# Patient Record
Sex: FEMALE | Race: WHITE | URBAN - METROPOLITAN AREA
[De-identification: names, ages, dates, MRNs, and addresses within clinical notes are randomized per-mention and may not be internally consistent; named-entity substitution may affect disease eponyms.]

---

## 2017-02-26 ENCOUNTER — OFFICE VISIT (OUTPATIENT)
Dept: FAMILY MEDICINE CLINIC | Facility: CLINIC | Age: 57
End: 2017-02-26

## 2017-02-26 VITALS
DIASTOLIC BLOOD PRESSURE: 78 MMHG | BODY MASS INDEX: 27 KG/M2 | SYSTOLIC BLOOD PRESSURE: 126 MMHG | TEMPERATURE: 98 F | OXYGEN SATURATION: 98 % | HEART RATE: 87 BPM | WEIGHT: 175 LBS | RESPIRATION RATE: 18 BRPM

## 2017-02-26 DIAGNOSIS — H65.01 RIGHT ACUTE SEROUS OTITIS MEDIA, RECURRENCE NOT SPECIFIED: ICD-10-CM

## 2017-02-26 DIAGNOSIS — J45.20 MILD INTERMITTENT ASTHMA WITHOUT COMPLICATION: ICD-10-CM

## 2017-02-26 DIAGNOSIS — J06.9 VIRAL URI WITH COUGH: Primary | ICD-10-CM

## 2017-02-26 PROCEDURE — 99213 OFFICE O/P EST LOW 20 MIN: CPT | Performed by: PHYSICIAN ASSISTANT

## 2017-02-26 RX ORDER — AMOXICILLIN 875 MG/1
875 TABLET, COATED ORAL 2 TIMES DAILY
Qty: 20 TABLET | Refills: 0 | Status: SHIPPED | OUTPATIENT
Start: 2017-02-26 | End: 2017-03-08

## 2017-02-26 NOTE — PATIENT INSTRUCTIONS
flovent 2 puffs inh twice a day   Ventolin every 4 hours as needed for wheezing  If ear pain worsens, then start amoxicillin to cover for fluid in the ears. Adult Self-Care for Colds  Colds are caused by viruses. They can’t be cured with antibiotics. · As your appetite returns, you can resume your normal diet.  Ask your doctor whether there are any foods you should avoid.     When to seek medical care  When you first notice symptoms, ask your health care provider if antiviral medications are appropriate __________________________     __________________________     __________________________     __________________________     __________________________ Long-term controllers:  __________________________  Name:  __________________________  Dose:  ___________ __________________________  Less than 50% of personal best · Use your quick-relief medicines  · Call your healthcare provider     Call 911 if:  · It is getting harder to breathe  · You can't walk or talk  · Your lips or fingers look barba or blue Donalrelyn Grapes

## 2017-02-26 NOTE — PROGRESS NOTES
CHIEF COMPLAINT:   Patient presents with:  URI      HPI:   Kemi Romero is a 49year old female who presents for upper respiratory symptoms for  5 days. Patient reports:runny nose, congestion, cough, mild wheezing/chest tightness, ear pressure.   Dang cetirizine (ZYRTEC ALLERGY) 10 MG Oral Tab Take 1 tablet by mouth daily.  Disp: 30 tablet Rfl: 0      Past Medical History   Diagnosis Date   • Asthma    • Hyperlipidemia    • Seasonal allergies    • Generalized anxiety disorder           Past Surgical Hist Right acute serous otitis media, recurrence not specified  Manju was seen today for uri.     Diagnoses and all orders for this visit:    Viral URI with cough    Mild intermittent asthma without complication    Right acute serous otitis media, recurrence not · Take acetaminophen or a nonsteroidal anti-inflammatory agent (NSAID), such as ibuprofen. Treat a troubled nose kindly  · Breathe steam or heated humidified air to open blocked nasal passages.  a hot shower or use a vaporizer.  Be careful not to g Date Last Reviewed: 6/19/2014  © 1447-5741 [address], [address] [place], [place] [address]. All rights reserved. This information is not intended as a substitute for professional medical care.  Always follow your healthcare professional You begin to have symptoms of a respiratory infection, if infections trigger your symptoms · Keep taking your long-term controller medicines  · Use your quick-relief medicine  · If you do not feel better within an hour after using your quick-relief medicin

## 2017-03-31 ENCOUNTER — TELEPHONE (OUTPATIENT)
Dept: FAMILY MEDICINE CLINIC | Facility: CLINIC | Age: 57
End: 2017-03-31

## 2017-07-12 ENCOUNTER — PATIENT OUTREACH (OUTPATIENT)
Dept: FAMILY MEDICINE CLINIC | Facility: CLINIC | Age: 57
End: 2017-07-12

## 2017-10-02 ENCOUNTER — OFFICE VISIT (OUTPATIENT)
Dept: FAMILY MEDICINE CLINIC | Facility: CLINIC | Age: 57
End: 2017-10-02

## 2017-10-02 VITALS
SYSTOLIC BLOOD PRESSURE: 112 MMHG | OXYGEN SATURATION: 99 % | DIASTOLIC BLOOD PRESSURE: 75 MMHG | TEMPERATURE: 99 F | HEART RATE: 76 BPM | RESPIRATION RATE: 18 BRPM

## 2017-10-02 DIAGNOSIS — E78.5 DYSLIPIDEMIA: ICD-10-CM

## 2017-10-02 DIAGNOSIS — IMO0001 MODERATE INTERMITTENT ASTHMA WITHOUT COMPLICATION: Primary | ICD-10-CM

## 2017-10-02 DIAGNOSIS — J30.2 CHRONIC SEASONAL ALLERGIC RHINITIS DUE TO OTHER ALLERGEN: ICD-10-CM

## 2017-10-02 DIAGNOSIS — F41.1 GENERALIZED ANXIETY DISORDER: ICD-10-CM

## 2017-10-02 PROCEDURE — 99214 OFFICE O/P EST MOD 30 MIN: CPT | Performed by: FAMILY MEDICINE

## 2017-10-02 RX ORDER — DESVENLAFAXINE 100 MG/1
100 TABLET, EXTENDED RELEASE ORAL DAILY
Qty: 30 TABLET | Refills: 12 | Status: SHIPPED | OUTPATIENT
Start: 2017-10-02 | End: 2018-10-11

## 2017-10-02 RX ORDER — ALBUTEROL SULFATE 90 UG/1
2 AEROSOL, METERED RESPIRATORY (INHALATION)
Qty: 18 G | Refills: 1 | Status: SHIPPED | OUTPATIENT
Start: 2017-10-02 | End: 2018-12-19

## 2017-10-02 NOTE — PATIENT INSTRUCTIONS
- Please change diet  start exercising. Start exercise to 3-5 times a week for 30-60 minutes which will improve youir cholesterol levels and protect your heart. - Foods high in omega-3's help good cholesterol and may lower bad cholesterol.  Eat salmon 2x ? Smoke from fireplaces  ? Vehicle exhaust  ? Smog or air pollution  ? Aerosol sprays  ? Strong odors, such as perfume, incense, or cooking odors  ? Household , such as ammonia or bleach  Allergens:  ? Cats  ? Dogs  ? Birds  ? Dust or dust mites  ? Allergic Rhinitis  Allergic rhinitis is an allergic reaction that affects the nose, and often the eyes. It’s often known as nasal allergies. Nasal allergies are often due to things in the environment that are breathed in.  Depending what you are sensitive t · Clean moldy areas with bleach and water. In general:  · Vacuum once or twice a week. If possible, use a vacuum with a high-efficiency particulate air (HEPA) filter. · Do not smoke. Avoid cigarette smoke.  Cigarette smoke is an irritant that can make sym

## 2017-10-02 NOTE — PROGRESS NOTES
CHIEF COMPLAINT: Patient presents with: Follow - Up: med refill    HPI:     Mj Lee is a 49year old female presents for recheck asthma and anxiety -needs med refills. Asthma-asthma since childhood. Triggers are URI and allergies.   Last as Paternal Grandmother    • Diabetes Father    • Prostate Cancer Father    • Cancer Paternal Grandfather      Lung   • Asthma Paternal Grandmother    • Cancer Maternal Grandfather      Lung   • Stroke Paternal Grandmother    • Asthma Son    • Allergies Son normocephaly, atraumatic. Sclera clear and non icteric bilaterally. Bilateral intact tympanic membranes without fluid and redness. Good cone of light bilaterally. Tonsils are clear no exudates bilaterally. Moist mucous membranes. Cobblestoning present.  Uvu AM 12/22/2011 100    • Bun/Creatinine Ratio 12/22/2011 16    • Sodium 12/22/2011 138    • Potassium 12/22/2011 4.8    • Chloride 12/22/2011 102    • Carbon Dioxide, Total 12/22/2011 23    • Calcium 12/22/2011 9.5    • Total Protein 12/22/2011 6.9    • Albu times daily. Rinse mouth after use  Dispense: 12 g; Refill: 12    3. Chronic seasonal allergic rhinitis due to other allergen  Continue otc zyrtec  Continue nasal steroid    4. Dyslipidemia  -Lipid panel is  Elevated.   - Please change diet  start exercisin Plan due on 10/31/2017  Asthma Control Test due on 10/31/2017      Patient/Caregiver Education: Patient/Caregiver Education: There are no barriers to learning. Medical education done. Outcome: Patient verbalizes understanding.  Patient is notified to call

## 2017-11-17 ENCOUNTER — PATIENT OUTREACH (OUTPATIENT)
Dept: FAMILY MEDICINE CLINIC | Facility: CLINIC | Age: 57
End: 2017-11-17

## 2017-11-17 NOTE — PROGRESS NOTES
Spoke to patient, patient is aware needs asthma visit and cpx visit. States she will call in December to schedule when she has her callander. No future appointments.

## 2018-02-19 ENCOUNTER — TELEPHONE (OUTPATIENT)
Dept: FAMILY MEDICINE CLINIC | Facility: CLINIC | Age: 58
End: 2018-02-19

## 2018-02-19 NOTE — TELEPHONE ENCOUNTER
Spoke to patient regarding symptoms. Patient believes she has stomach flu. Patient states she had 1 episode of vomiting followed by body aches and fatigue. Still feels tired and her appetite has not been strong. Patient began feeling sick on Friday.  Will m

## 2018-04-18 ENCOUNTER — TELEPHONE (OUTPATIENT)
Dept: FAMILY MEDICINE CLINIC | Facility: CLINIC | Age: 58
End: 2018-04-18

## 2018-05-11 NOTE — TELEPHONE ENCOUNTER
LMOM to return call to the office as this was my 3rd and final attempt to try to reach you. Provided pt office phone [phone] along with office hours given. Also, mailed patient a letter to contact office. Closing encounter.

## 2018-05-21 PROBLEM — M67.88 ACHILLES TENDINOSIS OF BOTH LOWER EXTREMITIES: Status: ACTIVE | Noted: 2018-05-21

## 2018-10-11 DIAGNOSIS — F41.1 GENERALIZED ANXIETY DISORDER: ICD-10-CM

## 2018-10-11 RX ORDER — DESVENLAFAXINE 100 MG/1
TABLET, EXTENDED RELEASE ORAL
Qty: 30 TABLET | Refills: 0 | Status: SHIPPED | OUTPATIENT
Start: 2018-10-11 | End: 2018-12-19

## 2018-10-11 NOTE — TELEPHONE ENCOUNTER
Please notify patient will not refill any medications until seen in office. Needs to come fasting is overdue for health maintenance, annual physical, mammogram and colon screening. Please asked patient if she has had any examinations elsewhere or seen an OB/GYN we need to call for records. Also needs annual psych visit for medication refill. May complete physical and med refill at same visit.   Thank you

## 2018-10-11 NOTE — TELEPHONE ENCOUNTER
PT requests a refill of desvenlafaxine, no protocol. Unable to approve. LOV 10/2/17  LF 10/2/17 #30 with 12 refills    No future appointments.

## 2018-10-20 DIAGNOSIS — IMO0001 MODERATE INTERMITTENT ASTHMA WITHOUT COMPLICATION: ICD-10-CM

## 2018-10-22 RX ORDER — DEXAMETHASONE 4 MG/1
TABLET ORAL
Qty: 12 G | Refills: 12 | OUTPATIENT
Start: 2018-10-22

## 2018-10-22 NOTE — TELEPHONE ENCOUNTER
Pt requesting refill of  Flovent protocol failed, unable to approve:     Last Time Medication was Filled:  10/2/17    Last Office Visit with PCP: 10/2/17    No future appointments.

## 2018-11-12 NOTE — TELEPHONE ENCOUNTER
LMOM to return call to the office as this is my 2nd attempt to try to reach you. Provided pt office phone [phone] along with office hours given. spouse

## 2018-11-16 DIAGNOSIS — F41.1 GENERALIZED ANXIETY DISORDER: ICD-10-CM

## 2018-11-16 RX ORDER — DESVENLAFAXINE 100 MG/1
TABLET, EXTENDED RELEASE ORAL
Qty: 30 TABLET | Refills: 0 | OUTPATIENT
Start: 2018-11-16

## 2018-11-16 NOTE — TELEPHONE ENCOUNTER
LOV 10/2/17    No refills without an appointment    Future Appointments   Date Time Provider Jeffrey Franco   11/28/2018  9:10 AM Skye Kaiser MD [facility] [address] [facility]

## 2018-11-19 DIAGNOSIS — F41.1 GENERALIZED ANXIETY DISORDER: ICD-10-CM

## 2018-11-19 RX ORDER — DESVENLAFAXINE 100 MG/1
TABLET, EXTENDED RELEASE ORAL
Qty: 30 TABLET | Refills: 0 | OUTPATIENT
Start: 2018-11-19

## 2018-11-19 NOTE — TELEPHONE ENCOUNTER
No refills without an appointment      Future Appointments   Date Time Provider Jeffrey Franco   11/28/2018  9:10 AM Fátima Kaiser MD [facility] [address] [facility]

## 2018-12-12 PROBLEM — M25.561 ACUTE PAIN OF RIGHT KNEE: Status: ACTIVE | Noted: 2018-12-12

## 2018-12-19 PROBLEM — E66.9 OBESITY (BMI 30.0-34.9): Status: ACTIVE | Noted: 2018-12-19

## 2018-12-27 PROCEDURE — 86803 HEPATITIS C AB TEST: CPT | Performed by: INTERNAL MEDICINE

## 2019-06-28 LAB — PATHOLOGIST NAME: NORMAL

## 2019-09-11 ENCOUNTER — HOSPITAL (OUTPATIENT)
Dept: OTHER | Age: 59
End: 2019-09-11
Attending: OBSTETRICS & GYNECOLOGY

## 2020-10-27 ENCOUNTER — OFFICE VISIT (OUTPATIENT)
Dept: DERMATOLOGY | Age: 60
End: 2020-10-27

## 2020-10-27 DIAGNOSIS — L72.3 SEBACEOUS CYST: ICD-10-CM

## 2020-10-27 DIAGNOSIS — D18.01 CHERRY ANGIOMA: Primary | ICD-10-CM

## 2020-10-27 DIAGNOSIS — L81.4 LENTIGO: ICD-10-CM

## 2020-10-27 PROCEDURE — 99202 OFFICE O/P NEW SF 15 MIN: CPT | Performed by: DERMATOLOGY

## 2020-10-27 PROCEDURE — 11301 SHAVE SKIN LESION 0.6-1.0 CM: CPT | Performed by: DERMATOLOGY

## 2020-10-27 RX ORDER — ALBUTEROL SULFATE 90 UG/1
2 AEROSOL, METERED RESPIRATORY (INHALATION)
COMMUNITY
Start: 2018-12-19

## 2020-10-27 RX ORDER — DEXAMETHASONE 4 MG/1
1 TABLET ORAL
COMMUNITY
Start: 2018-12-19

## 2020-10-27 RX ORDER — DESVENLAFAXINE 100 MG/1
TABLET, EXTENDED RELEASE ORAL EVERY MORNING
COMMUNITY
Start: 2020-10-08

## 2020-10-27 RX ORDER — GLUCOSAMINE HCL 500 MG
200 TABLET ORAL
COMMUNITY

## 2020-10-30 LAB — PATH REPORT PLASRBC-IMP: NORMAL

## 2021-04-20 ENCOUNTER — LAB REQUISITION (OUTPATIENT)
Dept: LAB | Age: 61
End: 2021-04-20

## 2021-04-20 DIAGNOSIS — Z12.4 ENCOUNTER FOR SCREENING FOR MALIGNANT NEOPLASM OF CERVIX: ICD-10-CM

## 2021-04-20 PROCEDURE — 87624 HPV HI-RISK TYP POOLED RSLT: CPT | Performed by: CLINICAL MEDICAL LABORATORY

## 2021-04-20 PROCEDURE — 88175 CYTOPATH C/V AUTO FLUID REDO: CPT | Performed by: CLINICAL MEDICAL LABORATORY

## 2021-04-23 ENCOUNTER — ORDER TRANSCRIPTION (OUTPATIENT)
Dept: ADMINISTRATIVE | Facility: HOSPITAL | Age: 61
End: 2021-04-23

## 2021-04-23 DIAGNOSIS — R93.1 ABNORMAL SCREENING CARDIAC CT: Primary | ICD-10-CM

## 2021-04-26 ENCOUNTER — HOSPITAL ENCOUNTER (OUTPATIENT)
Dept: CT IMAGING | Facility: HOSPITAL | Age: 61
Discharge: HOME | End: 2021-04-26
Attending: INTERNAL MEDICINE

## 2021-04-26 DIAGNOSIS — Z13.6 SCREENING FOR CARDIOVASCULAR CONDITION: ICD-10-CM

## 2021-04-27 LAB
CASE RPRT: NORMAL
CYTOLOGY CVX/VAG STUDY: NORMAL
HPV16+18+45 E6+E7MRNA CVX NAA+PROBE: NEGATIVE
Lab: NORMAL
PAP EDUCATIONAL NOTE: NORMAL
SPECIMEN ADEQUACY: NORMAL

## 2024-04-18 ENCOUNTER — LAB REQUISITION (OUTPATIENT)
Dept: OBGYN | Age: 64
End: 2024-04-18

## 2024-04-18 DIAGNOSIS — Z12.4 ENCOUNTER FOR SCREENING FOR MALIGNANT NEOPLASM OF CERVIX: ICD-10-CM

## 2024-04-18 PROCEDURE — 88175 CYTOPATH C/V AUTO FLUID REDO: CPT | Performed by: CLINICAL MEDICAL LABORATORY

## 2024-04-18 PROCEDURE — 87624 HPV HI-RISK TYP POOLED RSLT: CPT | Performed by: CLINICAL MEDICAL LABORATORY

## 2024-04-26 LAB
CASE RPRT: NORMAL
CYTOLOGY CVX/VAG STUDY: NORMAL
HPV16+18+45 E6+E7MRNA CVX NAA+PROBE: NEGATIVE
Lab: NORMAL
PAP EDUCATIONAL NOTE: NORMAL
SPECIMEN ADEQUACY: NORMAL

## (undated) NOTE — LETTER
11/02/17        Manju Nova  [address]      Dear Estevan Hogan,    [facility] records indicate that you have outstanding lab work and or testing that was ordered for you and has not yet been completed:    LIPID PANEL  CMP  To provide yo

## (undated) NOTE — MR AVS SNAPSHOT
[facility] [place]  [address] [facility] [address] [phone]               Thank you for choosing us for your health care visit with SEBASTIAN Hansen. We are glad to serve you and happy to provide you with this summary of your visit.   Please warm water. Suck on throat lozenges and cough drops to moisten your throat. · Cough medicines are available but it is unclear how effective they actually are.   · Take acetaminophen or an NSAID, such as ibuprofen to ease throat pain  Ease digestive problem _________________________  Phone:  _________________________  Phone:  _________________________   [facility]   My symptoms What I should do My medicine   · No wheezing, coughing, or chest tightness  · Asthma is not bothering your sleep, work, or school _________________________  Dose:  _________________________  How often:  _________________________  Special instructions:  _________________________  Quick-relief:  _________________________  _________________________     If your symptoms don't go away aft Inhale 1 puff into the lungs 2 (two) times daily. Commonly known as:  PULMICORT FLEXHALER           cefdinir 300 MG Caps   Take 1 capsule (300 mg total) by mouth 2 (two) times daily.    Commonly known as:  OMNICEF           cetirizine 10 MG Tabs   Take 1

## (undated) NOTE — LETTER
[address] [facility], [address]            May 11, 2018      [place] Mcdowell  [facility] [id number]      Dear Estevan Hogan:    Our office has been trying to contact you to discuss your